# Patient Record
Sex: MALE | Race: WHITE
[De-identification: names, ages, dates, MRNs, and addresses within clinical notes are randomized per-mention and may not be internally consistent; named-entity substitution may affect disease eponyms.]

---

## 2018-02-10 ENCOUNTER — HOSPITAL ENCOUNTER (EMERGENCY)
Dept: HOSPITAL 89 - ER | Age: 28
Discharge: HOME | End: 2018-02-10
Payer: OTHER GOVERNMENT

## 2018-02-10 VITALS — SYSTOLIC BLOOD PRESSURE: 120 MMHG | DIASTOLIC BLOOD PRESSURE: 81 MMHG

## 2018-02-10 VITALS — WEIGHT: 180 LBS | BODY MASS INDEX: 27.28 KG/M2 | HEIGHT: 68 IN

## 2018-02-10 DIAGNOSIS — S42.022A: Primary | ICD-10-CM
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
## 2018-02-10 PROCEDURE — 73000 X-RAY EXAM OF COLLAR BONE: CPT

## 2018-02-10 PROCEDURE — 99282 EMERGENCY DEPT VISIT SF MDM: CPT

## 2018-02-10 NOTE — ER REPORT
History and Physical


Time Seen By MD:  13:27


HPI/ROS


CHIEF COMPLAINT: Right shoulder pain





HISTORY OF PRESENT ILLNESS: 27-year-old male patient presents to emergency room 

with complaint of right shoulder pain. Patient states that he was snowboarding 

for the first time and fell onto his right shoulder. Patient states he believes 

that he has broken his clavicle. He denies having any numbness or tingling to 

the fingers, he states he has pain with any type of movement of the shoulder. 

Patient states he is not taking any medication. I did place him in a sling and 

swath [place]. He denies any head injury, dizziness, nausea, vomiting.





REVIEW OF SYSTEMS:


Respiratory: No cough, no dyspnea.


Cardiovascular: No chest pain, no palpitations.


Gastrointestinal: No vomiting, no abdominal pain.


Musculoskeletal: As noted above.


Allergies:  


Coded Allergies:  


     No Known Drug Allergies (Unverified , 2/10/18)


Home Meds


Active Scripts


Oxycodone Hcl/Acetaminophen (PERCOCET 5-325 MG TABLET) 1 Each Tablet, 1 EACH PO 

Q4-6H Y for PAIN, #20 TAB


   Prov:BHUPENDRA MENDOZA         2/10/18


Past Medical/Surgical History


Patient has no pertinent medical history.


Patient has surgical history of wisdom teeth removed.


Reviewed Nurses Notes:  Yes


Constitutional





Vital Sign - Last 24 Hours








 2/10/18 2/10/18 2/10/18 2/10/18





 13:32 13:37 13:52 14:00


 


Temp 98.4   


 


Pulse 84  81 


 


Resp 18   


 


B/P (MAP) 124/92 124/92 (103)  119/77 (91)


 


Pulse Ox 93  93 


 


O2 Delivery Room Air   


 


    





 2/10/18 2/10/18  





 14:22 14:30  


 


Pulse 85   


 


B/P (MAP)  120/81 (94)  


 


Pulse Ox 93   








Physical Exam


  General Appearance: The patient is alert, has no immediate need for airway 

protection and no current signs of toxicity.


Respiratory: Chest is non tender, lungs are clear to auscultation.


Cardiac: regular rate and rhythm


Gastrointestinal: Abdomen is soft and non tender, no masses, bowel sounds 

normal.


Musculoskeletal:  Neck: Neck is supple and non tender.


   Extremities have full range of motion and are non tender. Patient has 

tenderness deformity to the right clavicle. Patient is able to move fingers any 

difficulties.


Skin: No rashes or lesions.





DIFFERENTIAL DIAGNOSIS: After history and physical exam differential diagnosis 

was considered for clavicle fracture, contusion, shoulder dislocation.





Medical Decision Making


EKG/Imaging


Imaging


EXAMINATION:


Right clavicle radiographs 2 views


 


HISTORY:  Fall with pain, snowboarding.


 


COMPARISON: None.


 


FINDINGS: AP and AP angled views of the right clavicle are obtained.


 


Bones:  There is an acute, mildly comminuted fracture of the mid clavicle.


Joint spaces:  Negative.


Hardware: None.


Alignment:  1 shaft width inferior displacement of the distal fragment, and 2 

cm overlapping of the main fracture fragments.


Soft tissues/visualized lungs:  Negative.


 


IMPRESSION:


 


Acute, mildly comminuted fracture of the right mid clavicle with one shaft 

width inferior displacement and 2 cm overlapping of the fracture fragments.


 


Report Dictated By: Rhoda Servin MD at 2/10/2018 2:14 PM


 


Report E-Signed By: Rhoda Servin MD  at 2/10/2018 2:15 PM





ED Course/Re-evaluation


ED Course


Patient was admitted and examined, history and physical were obtained. 

Differential diagnoses were considered. On examination patient has obvious 

fracture of the clavicle. X-ray was done which confirmed that. Patient was 

placed in a shoulder immobilizer. Patient was given a Lortab here. On 

reexamination patient states he had some improvement in his pain. We did adjust 

a sling which seem to help. Patient be discharged home. He is to follow-up with 

orthopedics on Monday. He is to call to make an appointment. He is to ice the 

shoulder. He is to wear the sling 23/24 hours a day. He may take a shower. 

Patient was given a prescription of Percocet to help with the pain. I discussed 

this with the patient who verbalized understanding and agreement with plan.


Decision to Disposition Date:  Feb 10, 2018


Decision to Disposition Time:  14:19





Depart


Departure


Latest Vital Signs





Vital Signs








  Date Time  Temp Pulse Resp B/P (MAP) Pulse Ox O2 Delivery O2 Flow Rate FiO2


 


2/10/18 14:30    120/81 (94)    


 


2/10/18 14:22  85   93   


 


2/10/18 13:32 98.4  18   Room Air  








Impression:  


 Primary Impression:  


 Clavicle fracture, shaft


Condition:  Improved


Disposition:  HOME OR SELF-CARE


New Scripts


Oxycodone Hcl/Acetaminophen (PERCOCET 5-325 MG TABLET) 1 Each Tablet


1 EACH PO Q4-6H Y for PAIN, #20 TAB


   Prov: BHUPENDRA MENDOZA         2/10/18


Patient Instructions:  Clavicle Fracture (ED)





Additional Instructions:  


Limit activity by pain.


Ice the shoulder 2-3 times a day for 20-30 minutes.


Wear sling 23 out of 24 hours a day, may take a shower.


Follow up with orthopedics, call Monday to make an appointment.


You may take Ibuprofen as needed for pain in addition to the pain medication.


Don't take any additional Tylenol while on the pain medication.





Problem Qualifiers








 Primary Impression:  


 Clavicle fracture, shaft


 Encounter type:  initial encounter  Fracture type:  closed  Fracture alignment

:  displaced  Laterality:  left  Qualified Codes:  S42.022A - Displaced 

fracture of shaft of left clavicle, initial encounter for closed fracture








BHUPENDRA MENDOZA Feb 10, 2018 13:28

## 2018-02-10 NOTE — RADIOLOGY IMAGING REPORT
FACILITY: [facility] 

 

PATIENT NAME: Wilmer Mendenhall

: 1990

MR: [id number]

V: [id number]

EXAM DATE: 

ORDERING PHYSICIAN: BHUPENDRA MENDOZA

TECHNOLOGIST: 

 

Location: [facility]

Patient: Wilmer Mendenhall

: 1990

MRN: [id number]

Visit/Account:[id number]

Date of Sevice:  2/10/2018

 

ACCESSION #: [id number]

 

EXAMINATION:

Right clavicle radiographs 2 views

 

HISTORY:  Fall with pain, snowboarding.

 

COMPARISON: None.

 

FINDINGS: AP and AP angled views of the right clavicle are obtained.

 

Bones:  There is an acute, mildly comminuted fracture of the mid clavicle.

Joint spaces:  Negative.

Hardware: None.

Alignment:  1 shaft width inferior displacement of the distal fragment, and 2 cm overlapping of the m
ain fracture fragments.

Soft tissues/visualized lungs:  Negative.

 

IMPRESSION:

 

Acute, mildly comminuted fracture of the right mid clavicle with one shaft width inferior displacemen
t and 2 cm overlapping of the fracture fragments.

 

Report Dictated By: Rhoda Servin MD at 2/10/2018 2:14 PM

 

Report E-Signed By: Rhoda Servin MD  at 2/10/2018 2:15 PM

 

WSN:[id number]